# Patient Record
Sex: FEMALE | Race: WHITE | URBAN - METROPOLITAN AREA
[De-identification: names, ages, dates, MRNs, and addresses within clinical notes are randomized per-mention and may not be internally consistent; named-entity substitution may affect disease eponyms.]

---

## 2023-07-26 ENCOUNTER — APPOINTMENT (RX ONLY)
Dept: URBAN - METROPOLITAN AREA CLINIC 82 | Facility: CLINIC | Age: 33
Setting detail: DERMATOLOGY
End: 2023-07-26

## 2023-07-26 DIAGNOSIS — D49.2 NEOPLASM OF UNSPECIFIED BEHAVIOR OF BONE, SOFT TISSUE, AND SKIN: ICD-10-CM

## 2023-07-26 DIAGNOSIS — D22 MELANOCYTIC NEVI: ICD-10-CM

## 2023-07-26 DIAGNOSIS — L57.8 OTHER SKIN CHANGES DUE TO CHRONIC EXPOSURE TO NONIONIZING RADIATION: ICD-10-CM

## 2023-07-26 DIAGNOSIS — Z41.9 ENCOUNTER FOR PROCEDURE FOR PURPOSES OTHER THAN REMEDYING HEALTH STATE, UNSPECIFIED: ICD-10-CM

## 2023-07-26 DIAGNOSIS — D18.0 HEMANGIOMA: ICD-10-CM

## 2023-07-26 DIAGNOSIS — L81.4 OTHER MELANIN HYPERPIGMENTATION: ICD-10-CM

## 2023-07-26 DIAGNOSIS — L82.1 OTHER SEBORRHEIC KERATOSIS: ICD-10-CM

## 2023-07-26 PROBLEM — D22.5 MELANOCYTIC NEVI OF TRUNK: Status: ACTIVE | Noted: 2023-07-26

## 2023-07-26 PROBLEM — D18.01 HEMANGIOMA OF SKIN AND SUBCUTANEOUS TISSUE: Status: ACTIVE | Noted: 2023-07-26

## 2023-07-26 PROCEDURE — ? BIOPSY BY SHAVE METHOD

## 2023-07-26 PROCEDURE — 99213 OFFICE O/P EST LOW 20 MIN: CPT | Mod: 25

## 2023-07-26 PROCEDURE — ? COUNSELING

## 2023-07-26 PROCEDURE — ? ADDITIONAL NOTES

## 2023-07-26 PROCEDURE — 11102 TANGNTL BX SKIN SINGLE LES: CPT

## 2023-07-26 PROCEDURE — ? BOTOX

## 2023-07-26 PROCEDURE — ? COSMETIC QUOTE

## 2023-07-26 ASSESSMENT — LOCATION DETAILED DESCRIPTION DERM
LOCATION DETAILED: RIGHT MEDIAL SUPERIOR CHEST
LOCATION DETAILED: RIGHT SUPERIOR MEDIAL UPPER BACK
LOCATION DETAILED: LEFT CENTRAL MALAR CHEEK
LOCATION DETAILED: LEFT MEDIAL UPPER BACK
LOCATION DETAILED: EPIGASTRIC SKIN
LOCATION DETAILED: LEFT INFERIOR MEDIAL MALAR CHEEK
LOCATION DETAILED: LEFT PROXIMAL RADIAL DORSAL FOREARM
LOCATION DETAILED: GLABELLA
LOCATION DETAILED: RIGHT PROXIMAL DORSAL FOREARM
LOCATION DETAILED: RIGHT INFERIOR LATERAL MALAR CHEEK
LOCATION DETAILED: LEFT INFERIOR MEDIAL UPPER BACK
LOCATION DETAILED: LEFT INFERIOR CENTRAL MALAR CHEEK

## 2023-07-26 ASSESSMENT — LOCATION ZONE DERM
LOCATION ZONE: TRUNK
LOCATION ZONE: FACE
LOCATION ZONE: ARM
LOCATION ZONE: FACE

## 2023-07-26 ASSESSMENT — LOCATION SIMPLE DESCRIPTION DERM
LOCATION SIMPLE: LEFT CHEEK
LOCATION SIMPLE: CHEST
LOCATION SIMPLE: GLABELLA
LOCATION SIMPLE: RIGHT UPPER BACK
LOCATION SIMPLE: RIGHT CHEEK
LOCATION SIMPLE: ABDOMEN
LOCATION SIMPLE: LEFT UPPER BACK
LOCATION SIMPLE: LEFT FOREARM
LOCATION SIMPLE: RIGHT FOREARM

## 2023-07-26 NOTE — PROCEDURE: COSMETIC QUOTE
Breast Procedure 10 Free Text Discount (In Dollars- Use Only Numbers And Decimals): 0.00
Laser 18 Units: 0
Injectable 5 Price/Unit (In Dollars- Use Only Numbers And Decimals): 395.00
Misc Procedure 1 Price/Unit (In Dollars- Use Only Numbers And Decimals): 225.00
Injectable  12: Xeomin
Laser 4 Price/Unit (In Dollars- Use Only Numbers And Decimals): 1250.00
Laser 11: IPL Photofacial
Misc Procedure 8: Thread for pox scarring-cheeks
Injectable 2 David/Unit (In Dollars- Use Only Numbers And Decimals): 800.00
Misc Procedure 5: Threads Eyelift
Laser 8: Profound-Cheek Scarring
Laser 1 Price/Unit (In Dollars- Use Only Numbers And Decimals): 2500.00
Injectable 6: Mignon Kasper
Misc Procedure 2: Collagen threads-Lip - lips
Laser 5: SK Removal-Face
Laser 16 Price/Unit (In Dollars- Use Only Numbers And Decimals): 400.00
Injectable 3: Versa
Laser 2: Full Face and Neck-Fusion
Injectable  13 Price/Unit (In Dollars- Use Only Numbers And Decimals): 600.00
Laser 12 Price/Unit (In Dollars- Use Only Numbers And Decimals): 900.00
Laser 13 Price/Unit (In Dollars- Use Only Numbers And Decimals): 1500.00
Injectable 7: Volux
Misc Procedure 3: Collagen Stimulating threads Per thread only
Number Of Months: 1
Laser 6: Sk Removal-Face and Neck
Laser 17 Price/Unit (In Dollars- Use Only Numbers And Decimals): 1000.00
Injectable 4: Volbella 1cc
Laser 3: Full Face-Mid Depth
Laser 14 Price/Unit (In Dollars- Use Only Numbers And Decimals): 250.00
Injectable 1: Radiesse
Misc Procedure 7 Price/Unit (In Dollars- Use Only Numbers And Decimals): 50.00
Injectable  11 Price/Unit (In Dollars- Use Only Numbers And Decimals): 12.00
Laser 18: LHR-Legs/Back-TBD
Injectable 11 Free Text Discount (In Dollars- Use Only Numbers And Decimals): 120.00
Injectable 8 Price/Unit (In Dollars- Use Only Numbers And Decimals): 500.00
Laser 15: Ina Ashing
Send Charges To Patient Encounter: No
Laser 15 Price/Unit (In Dollars- Use Only Numbers And Decimals): 300.00
Injectable 2: Voluma
Include Sales Tax On Surgeon's Fees: Yes
Laser 1: Full Face-Fusion
Misc Procedure 5 Price/Unit (In Dollars- Use Only Numbers And Decimals): 2000.00
Laser 16: Reynaldo
Injectable  13: Kybella/Vial
Injectable 6 Price/Unit (In Dollars- Use Only Numbers And Decimals): 700.00
Misc Procedure 2 Price/Unit (In Dollars- Use Only Numbers And Decimals): 200.00
Laser 12: IPL Photoface-Face and Neck
Laser 13: IPL Photofacial-Face, Neck, Chest
Laser 2 Price/Unit (In Dollars- Use Only Numbers And Decimals): 3000.00
Laser 9: CO2RE- Pox scarring-cheeks
Misc Procedure 6: Threads Browlift
Laser 17: LHR-Arms
Injectable  14: Qwo/Session
Misc Procedure 3 Price/Unit (In Dollars- Use Only Numbers And Decimals): 10.00
Injectable 4 Price/Unit (In Dollars- Use Only Numbers And Decimals): 795.00
Laser 14: LHR-Lip
Laser 3 Price/Unit (In Dollars- Use Only Numbers And Decimals): 1800.00
Injectable  11: Botox
Laser 10: CO2RE Intima
Misc Procedure 7: [address]
Face Procedure 1: mole removal
Injectable 1 Price/Unit (In Dollars- Use Only Numbers And Decimals): 650.00
Injectable 8: Maryanne
Injectable  11 Units: [address]
Misc Procedure 4: Threads Neck Lift
Detail Level: Zone
Laser 7: VV/PPP on Genitals
Misc Procedure 1: Threadlift Per Thread
Injectable 5: Volbella 0.55cc
Laser 4: Full Face-Light

## 2023-07-26 NOTE — PROCEDURE: ADDITIONAL NOTES
Additional Notes: Erin in July buy 30 get 10 units free \\n10 pulled from the sample bottle
Render Risk Assessment In Note?: no
Detail Level: Simple

## 2023-07-26 NOTE — PROCEDURE: COSMETIC QUOTE
Laser 6: Sk Removal-Face and Neck
Body Procedure 1 Free Text Discount (In Dollars- Use Only Numbers And Decimals): 0.00
Breast Procedure 1 Units: 0
Injectable 6: Perez Guallpa
Injectable 7: Volux
Facility Fee Units (Optional): 1
Misc Procedure 7: [address]
Laser 16 Price/Unit (In Dollars- Use Only Numbers And Decimals): 400.00
Injectable 3: Versa
Misc Procedure 4: Threads Neck Lift
Apply Anesthesia Fee: No
Face Procedure 1: mole removal
Laser 13 Price/Unit (In Dollars- Use Only Numbers And Decimals): 1500.00
Injectable  14 Price/Unit (In Dollars- Use Only Numbers And Decimals): 800.00
Misc Procedure 1: Threadlift Per Thread
Face Procedure 1 Units: 2
Laser 10 Price/Unit (In Dollars- Use Only Numbers And Decimals): 1000.00
Laser 17: LHR-Arms
Injectable  11 Price/Unit (In Dollars- Use Only Numbers And Decimals): 12.00
Laser 4 Price/Unit (In Dollars- Use Only Numbers And Decimals): 1250.00
Misc Procedure 8 Price/Unit (In Dollars- Use Only Numbers And Decimals): 250.00
Injectable 8 Price/Unit (In Dollars- Use Only Numbers And Decimals): 500.00
Laser 14: LHR-Lip
Detail Level: Zone
Injectable 4 Price/Unit (In Dollars- Use Only Numbers And Decimals): 795.00
Laser 1 Price/Unit (In Dollars- Use Only Numbers And Decimals): 2500.00
Injectable 1: Radiesse
Misc Procedure 2: Collagen threads-Lip - lips
Laser 18: LHR-Legs/Back-TBD
Laser 15: Arina Guerrero
Laser 8 Price/Unit (In Dollars- Use Only Numbers And Decimals): 2000.00
Laser 2 Price/Unit (In Dollars- Use Only Numbers And Decimals): 3000.00
Injectable 5 Price/Unit (In Dollars- Use Only Numbers And Decimals): 395.00
Laser 12: IPL Photoface-Face and Neck
Injectable  13: Kybella/Vial
Misc Procedure 3 Price/Unit (In Dollars- Use Only Numbers And Decimals): 10.00
Laser 9: CO2RE- Pox scarring-cheeks
Laser 3: Full Face-Mid Depth
Laser 16: Reynaldo
Laser 3 Price/Unit (In Dollars- Use Only Numbers And Decimals): 1800.00
Injectable 6 Price/Unit (In Dollars- Use Only Numbers And Decimals): 700.00
Misc Procedure 7 Price/Unit (In Dollars- Use Only Numbers And Decimals): 50.00
Laser 13: IPL Photofacial-Face, Neck, Chest
Injectable 3 David/Unit (In Dollars- Use Only Numbers And Decimals): 600.00
Injectable  14: Qwo/Session
Injectable  11: Botox
Laser 10: CO2RE Intima
Laser 4: Full Face-Light
Laser 7: VV/PPP on Genitals
Misc Procedure 1 Price/Unit (In Dollars- Use Only Numbers And Decimals): 225.00
Misc Procedure 8: Thread for pox scarring-cheeks
Injectable 8: Maryanne
Laser 1: Full Face-Fusion
Injectable 4: Volbella 1cc
Misc Procedure 5: Threads Eyelift
Injectable  12: Xeomin
Laser 11: IPL Photofacial
Injectable 1 Price/Unit (In Dollars- Use Only Numbers And Decimals): 650.00
Laser 5: SK Removal-Face
Misc Procedure 2 Price/Unit (In Dollars- Use Only Numbers And Decimals): 200.00
Laser 8: Profound-Cheek Scarring
Include Sales Tax On Surgeon's Fees: Yes
Laser 2: Full Face and Neck-Fusion
Injectable 5: Volbella 0.55cc
Misc Procedure 6: Threads Browlift
Laser 15 Price/Unit (In Dollars- Use Only Numbers And Decimals): 300.00
Misc Procedure 3: Collagen Stimulating threads Per thread only
Injectable 2: Voluma
Laser 12 Price/Unit (In Dollars- Use Only Numbers And Decimals): 900.00

## 2023-07-26 NOTE — PROCEDURE: COUNSELING
Sunscreen Recommendations: Vega Lopez MD Sport or Active for outdoor activity or watersports. Rec Clear or Daily or Element for every day use.
Detail Level: Zone
Laser Recommendations: CO2RE laser can be performed for skin resurfacing and rejuvenation. Recommend patient inquire for more details if interested.
Bleaching Agents Recommendations: For a non-hydroquinone option, Cyspera 3-step treatment is a good alternative.
Chemical Peel Recommendations: Can consult with Jadon Bradford, for free consult.
Sunscreen Recommendations: Elta MD or other zinc-based sunblock is recommended daily. Please find a concentration with at least 10% zinc or higher for daily use and ideal for 10-20% for prolonged outdoor physical activity.
Ipl Recommendations: IPL Photofacial is usually 1 treatment with possible 1-2 touchups. Treatments are performed once monthly.
Topical Retinoids Recommendations: Retinoids work great and should be applied in the evening. Please note they do increase photosensitivity. A daily sunblock should be worn with these at all times to minimize side effects.
Detail Level: Detailed
Laser Recommendations: CO2RE laser can remove hyperpigmenation and fine lines and wrinkles.
Detail Level: Generalized

## 2023-07-26 NOTE — PROCEDURE: BIOPSY BY SHAVE METHOD
Detail Level: Detailed
Depth Of Biopsy: dermis
Was A Bandage Applied: Yes
Size Of Lesion In Cm: 0
Biopsy Type: H and E
Biopsy Method: sterile single edge surgical blade
Anesthesia Type: 1% lidocaine with epinephrine
Anesthesia Volume In Cc (Will Not Render If 0): 0.2
Hemostasis: Aluminum Chloride
Wound Care: Aquaphor
Dressing: pressure dressing with telfa
Destruction After The Procedure: No
Type Of Destruction Used: Curettage
Curettage Text: The wound bed was treated with curettage after the biopsy was performed.
Cryotherapy Text: The wound bed was treated with cryotherapy after the biopsy was performed.
Electrodesiccation Text: The wound bed was treated with electrodesiccation after the biopsy was performed.
Electrodesiccation And Curettage Text: The wound bed was treated with electrodesiccation and curettage after the biopsy was performed.
Silver Nitrate Text: The wound bed was treated with silver nitrate after the biopsy was performed.
Lab: -[id number]
Consent: Written consent was obtained and risks were reviewed including but not limited to scarring, infection, bleeding, scabbing, incomplete removal, nerve damage and allergy to anesthesia.
Post-Care Instructions: I reviewed with the patient in detail post-care instructions. Patient is to keep the biopsy site covered with bandage for 24 hours and then gentle cleanse with mild soap and water, pat dry and continue to cover with Aquaphor/petroleum and a bandage daily until wound is fully healed.
Notification Instructions: Patient will be notified of biopsy results. However, patient instructed to call the office if not contacted within 2 weeks.
Billing Type: [employer]
Information: Selecting Yes will display possible errors in your note based on the variables you have selected. This validation is only offered as a suggestion for you. PLEASE NOTE THAT THE VALIDATION TEXT WILL BE REMOVED WHEN YOU FINALIZE YOUR NOTE. IF YOU WANT TO FAX A PRELIMINARY NOTE YOU WILL NEED TO TOGGLE THIS TO 'NO' IF YOU DO NOT WANT IT IN YOUR FAXED NOTE.

## 2023-07-26 NOTE — PROCEDURE: BOTOX
Post-Care Instructions: Patient instructed to avoid lying down or bending over for 4 hours and limit physical activity for 24 hours. Rec patient exercise facial muscles for 20-30 minutes. Can ice if a bruise develops and take acetaminophen or an NSAID if a headache develops post treatment.
[facility] Units: 0
Dilution (U/0.1 Cc): 2
Show Right And Left Periorbital Units: No
Show Masseter Units: Yes
Glabellar Complex Units: 15
Additional Area 3 Location: Tear Trough
Additional Area 6 Location: Crow’s
Additional Area 5 Location: Lip Flip
Detail Level: Detailed
Incrementing Botox Units: By 0.5 Units
Additional Area 2 Location: Rober Tripathi
Additional Area 2 Units: 12
Show Inventory Tab: Show
Masseter Units: 9
Additional Area 4 Location: Bunny Lines
Consent: Written consent obtained. Risks include but not limited to lid/brow ptosis, bruising, swelling, diplopia, temporary effect, incomplete chemical denervation.
Forehead Units: 4
Additional Area 1 Location: Nasal Columella

## 2023-12-13 ENCOUNTER — APPOINTMENT (RX ONLY)
Dept: URBAN - METROPOLITAN AREA CLINIC 82 | Facility: CLINIC | Age: 33
Setting detail: DERMATOLOGY
End: 2023-12-13

## 2023-12-13 DIAGNOSIS — Z41.9 ENCOUNTER FOR PROCEDURE FOR PURPOSES OTHER THAN REMEDYING HEALTH STATE, UNSPECIFIED: ICD-10-CM

## 2023-12-13 PROCEDURE — ? BOTOX

## 2023-12-13 PROCEDURE — ? XEOMIN

## 2023-12-13 ASSESSMENT — LOCATION DETAILED DESCRIPTION DERM
LOCATION DETAILED: GLABELLA
LOCATION DETAILED: LEFT INFERIOR FOREHEAD
LOCATION DETAILED: RIGHT INFERIOR TEMPLE
LOCATION DETAILED: RIGHT INFERIOR FOREHEAD
LOCATION DETAILED: LEFT INFERIOR TEMPLE

## 2023-12-13 ASSESSMENT — LOCATION SIMPLE DESCRIPTION DERM
LOCATION SIMPLE: LEFT TEMPLE
LOCATION SIMPLE: RIGHT TEMPLE
LOCATION SIMPLE: RIGHT FOREHEAD
LOCATION SIMPLE: GLABELLA
LOCATION SIMPLE: LEFT FOREHEAD

## 2023-12-13 ASSESSMENT — LOCATION ZONE DERM: LOCATION ZONE: FACE

## 2023-12-13 NOTE — PROCEDURE: BOTOX
Masseter Units: 0
Show Glabellar Units: Yes
Additional Area 3 Location: Tear Trough
Additional Area 6 Location: Crow’s
Show Right And Left Pupillary Line Units: No
Additional Area 2 Location: Chin
Additional Area 5 Location: Lip Flip
Detail Level: Detailed
Additional Area 4 Location: Bunny Lines
Incrementing Botox Units: By 0.5 Units
Post-Care Instructions: Patient instructed to avoid lying down or bending over for 4 hours and limit physical activity for 24 hours. Rec patient exercise facial muscles for 20-30 minutes.  Can ice if a bruise develops and take acetaminophen or an NSAID if a headache develops post treatment.
Show Inventory Tab: Show
Additional Area 1 Location: Nasal Columella
Consent: Written consent obtained. Risks include but not limited to lid/brow ptosis, bruising, swelling, diplopia, temporary effect, incomplete chemical denervation.
Dilution (U/0.1 Cc): 2
Forehead Units: 12
Glabellar Complex Units: 20

## 2023-12-13 NOTE — PROCEDURE: XEOMIN
Levator Labii Superioris Units: 0
Show Additional Area 3: Yes
Show Inventory Tab: Show
Additional Area 3 Location: Phil
Show Periorbital Units: No
Masseter Units: 30
Consent: Written consent obtained. Risks include but not limited to lid/brow ptosis, bruising, swelling, diplopia, temporary effect, incomplete chemical denervation.
Post-Care Instructions: Patient instructed to not lie down for 4 hours and limit physical activity for 24 hours.
Additional Area 1 Location: Lateral Brow
Dilution (U/0.1 Cc): 2
Detail Level: Detailed
Additional Area 2 Location: Depressor Septi Nasi